# Patient Record
Sex: MALE | Race: WHITE | NOT HISPANIC OR LATINO | Employment: OTHER | URBAN - METROPOLITAN AREA
[De-identification: names, ages, dates, MRNs, and addresses within clinical notes are randomized per-mention and may not be internally consistent; named-entity substitution may affect disease eponyms.]

---

## 2020-12-18 ENCOUNTER — OFFICE VISIT (OUTPATIENT)
Dept: FAMILY MEDICINE CLINIC | Facility: CLINIC | Age: 30
End: 2020-12-18

## 2020-12-18 VITALS
HEIGHT: 72 IN | OXYGEN SATURATION: 100 % | HEART RATE: 78 BPM | SYSTOLIC BLOOD PRESSURE: 144 MMHG | RESPIRATION RATE: 16 BRPM | WEIGHT: 179.2 LBS | DIASTOLIC BLOOD PRESSURE: 84 MMHG | BODY MASS INDEX: 24.27 KG/M2 | TEMPERATURE: 99.1 F

## 2020-12-18 DIAGNOSIS — F41.9 ANXIETY AND DEPRESSION: ICD-10-CM

## 2020-12-18 DIAGNOSIS — F32.A ANXIETY AND DEPRESSION: ICD-10-CM

## 2020-12-18 DIAGNOSIS — K51.919 ULCERATIVE COLITIS WITH COMPLICATION, UNSPECIFIED LOCATION (HCC): ICD-10-CM

## 2020-12-18 DIAGNOSIS — B18.2 CHRONIC HEPATITIS C WITHOUT HEPATIC COMA (HCC): ICD-10-CM

## 2020-12-18 DIAGNOSIS — F19.90 SUBSTANCE USE DISORDER: Primary | ICD-10-CM

## 2020-12-18 DIAGNOSIS — F19.90 IV DRUG USER: ICD-10-CM

## 2020-12-18 PROCEDURE — 87522 HEPATITIS C REVRS TRNSCRPJ: CPT | Performed by: FAMILY MEDICINE

## 2020-12-18 PROCEDURE — 36415 COLL VENOUS BLD VENIPUNCTURE: CPT | Performed by: FAMILY MEDICINE

## 2020-12-18 PROCEDURE — 86803 HEPATITIS C AB TEST: CPT | Performed by: FAMILY MEDICINE

## 2020-12-18 PROCEDURE — 80053 COMPREHEN METABOLIC PANEL: CPT | Performed by: FAMILY MEDICINE

## 2020-12-18 PROCEDURE — 99204 OFFICE O/P NEW MOD 45 MIN: CPT | Performed by: FAMILY MEDICINE

## 2020-12-18 RX ORDER — CITALOPRAM 10 MG/1
10 TABLET ORAL DAILY
Qty: 15 TABLET | Refills: 0 | Status: SHIPPED | OUTPATIENT
Start: 2020-12-18 | End: 2021-01-05 | Stop reason: SDUPTHER

## 2020-12-18 NOTE — PROGRESS NOTES
New Patient Office Visit      Patient Name: Benitez Marrufo  : 1990   MRN: [id number]     Chief Complaint:    Chief Complaint   Patient presents with   • Establish Care   • Anxiety   • Depression       History of Present Illness: Benitez Marrufo is a 30 y.o. male who is here today to establish care.  Patient presents today with chronic substance use disorder.  Patient uses heroin and has used it 4 times in the last month.  Patient says he was using it over 10 times a month previously.  Patient has been to  and has been to the [facility].  Patient has tried Suboxone but did not respond well to that and says it precipitated his withdrawal.  Patient would like to get further help with his addiction.  Patient is okay with going to chemical dependency consult for further help.  Patient would like to quit and has a good support system.  Patient lives with his girlfriend, his son and her daughter.  Patient does report anhedonia.    Depression anxiety-patient has anxiety and depression has been worsening acutely.  Patient says after he stopped using heroin that he feels like there is a big blanket of depression on him.  Patient says he feels down he can feel very down at times.  Patient says other times he feels like he has appropriate energy levels.  Patient denies staying up for multiple nights in a row and denies excessive spending habits.  Patient does have risky gambling habits.  Patient filled out a PHQ-9 and the LYNN-7 and his PHQ-9 was 21 and his LYNN-7 was 18 today.  Patient is willing to start Celexa and he also like to go to a therapist.  Patient believes that stopping heroin has worsened his depression anxiety.  Patient does have concern for bipolar disorder and reports his sister has recently been diagnosed with it.  Patient denies a family history of bipolar disorder.  Patient does report having some mood swings.  Patient denies any suicidal thoughts or homicidal thoughts.  Patient is okay with starting  Celexa today.    Hepatitis C-patient reports being positive hepatitis C for the last few years and has never been treated.  Patient would like to go to hematologist to begin treatment.  Patient would like to quit using IV drugs and understands this may be a problem with chronic treatment.    Ulcerative colitis-patient has a history of ulcerative colitis.  Patient reports not been to a doctor for a very long time.  Patient reports that he does have some blood in his stool currently.  Patient says that doing heroin usually makes him constipated and he never notices ulcerative colitis but since has been off heroin that he has noticed more loose bowels and blood in his stool.  Patient denies any weight loss or stooling at night.    Subjective      Review of Systems:   Review of Systems   Constitutional: Positive for fatigue. Negative for fever and unexpected weight gain.   HENT: Negative for trouble swallowing.    Eyes: Negative for visual disturbance.   Respiratory: Negative for shortness of breath.    Cardiovascular: Negative for chest pain.   Gastrointestinal: Positive for blood in stool.   Endocrine: Negative for polyuria.   Genitourinary: Negative for difficulty urinating.   Musculoskeletal: Negative for back pain.   Skin: Negative for pallor.   Neurological: Negative for weakness.   Hematological: Does not bruise/bleed easily.   Psychiatric/Behavioral: Positive for dysphoric mood, sleep disturbance, depressed mood and stress. Negative for suicidal ideas. The patient is nervous/anxious.        Past Medical History: History reviewed. No pertinent past medical history.    Past Surgical History:   Past Surgical History:   Procedure Laterality Date   • ADENOIDECTOMY     • EAR TUBES     • HAND SURGERY     • TONSILLECTOMY         Family History:   Family History   Problem Relation Age of Onset   • No Known Problems Mother    • Hypertension Father    • No Known Problems Sister    • No Known Problems Son    • Lung cancer  "Maternal Grandmother    • Lung cancer Maternal Grandfather    • No Known Problems Paternal Grandmother    • No Known Problems Paternal Grandfather    • Depression Sister    • Bipolar disorder Sister        Social History:   Social History     Socioeconomic History   • Marital status: Single     Spouse name: Not on file   • Number of children: Not on file   • Years of education: Not on file   • Highest education level: Not on file   Tobacco Use   • Smoking status: Current Every Day Smoker     Packs/day: 0.50     Types: Cigarettes   • Smokeless tobacco: Never Used   Substance and Sexual Activity   • Alcohol use: Yes     Comment: social    • Drug use: Yes     Types: Heroin, Other     Comment: opiates       Medications:     Current Outpatient Medications:   •  citalopram (CeleXA) 10 MG tablet, Take 1 tablet by mouth Daily. Call for refill, Disp: 15 tablet, Rfl: 0    Allergies:   No Known Allergies    Objective     Physical Exam:  Vital Signs:   Vitals:    12/18/20 1059   BP: 144/84   BP Location: Right arm   Patient Position: Sitting   Cuff Size: Adult   Pulse: 78   Resp: 16   Temp: 99.1 °F (37.3 °C)   TempSrc: Temporal   SpO2: 100%   Weight: 81.3 kg (179 lb 3.2 oz)   Height: 182.9 cm (72\")   PainSc: 0-No pain     Body mass index is 24.3 kg/m².     Physical Exam  Vitals signs and nursing note reviewed.   Constitutional:       General: He is not in acute distress.     Appearance: He is well-developed.   HENT:      Head: Normocephalic and atraumatic.      Right Ear: External ear normal.      Left Ear: External ear normal.   Eyes:      General:         Right eye: No discharge.         Left eye: No discharge.      Conjunctiva/sclera: Conjunctivae normal.      Pupils: Pupils are equal, round, and reactive to light.   Neck:      Musculoskeletal: Normal range of motion and neck supple.   Cardiovascular:      Rate and Rhythm: Normal rate and regular rhythm.      Heart sounds: Normal heart sounds. No murmur.   Pulmonary:      " Effort: Pulmonary effort is normal. No respiratory distress.      Breath sounds: Normal breath sounds. No wheezing.   Abdominal:      General: Bowel sounds are normal. There is no distension.      Palpations: Abdomen is soft.   Musculoskeletal: Normal range of motion.         General: No deformity.   Skin:     General: Skin is warm and dry.   Neurological:      Mental Status: He is alert and oriented to person, place, and time.      Cranial Nerves: No cranial nerve deficit.   Psychiatric:         Behavior: Behavior normal.         Thought Content: Thought content normal.         Judgment: Judgment normal.         Assessment / Plan      Assessment/Plan:   Diagnoses and all orders for this visit:    1. Substance use disorder (Primary)  -     Ambulatory Referral to Chemical Dependency    2. Chronic hepatitis C without hepatic coma (CMS/HCC)  -     Ambulatory Referral to Gastroenterology  -     Hepatitis C Antibody  -     Cancel: Hepatitis C RNA, Quantitative, PCR (graph); Future  -     Comprehensive Metabolic Panel  -     Hepatitis C RNA, Quantitative, PCR (graph)    3. IV drug user  -     Ambulatory Referral to Chemical Dependency    4. Ulcerative colitis with complication, unspecified location (CMS/HCC)  -     Ambulatory Referral to Gastroenterology    5. Anxiety and depression  -     Ambulatory Referral to Behavioral Health  -     citalopram (CeleXA) 10 MG tablet; Take 1 tablet by mouth Daily. Call for refill  Dispense: 15 tablet; Refill: 0         1. I have referred patient to chemical dependency for further evaluation and management for substance use disorder.  I recommend trying methadone the patient has this may stabilize his use.  However patient is reluctant to start methadone and would not like to be on it long-term.  Patient would benefit from counseling and education regarding methadone and Suboxone and relapse of heroin addicts.  2. For patient's hepatitis C of started the work-up with a viral quant,  antibody test and a referral to GI.  I would like GI to also evaluate and manage his ulcerative colitis.  3. For patient's anxiety and depression I referred him to behavioral health for counseling.  Also started Celexa 10 mg in 2 weeks patient will call for refill at that time we will either change the medication, increase to 20 mg or keep it at 10 mg.  Hopefully patient has side effects he can push through the side effects and increase the dose as Celexa will likely help his mood.  If Celexa does not work then we will try Wellbutrin.  We may also add Wellbutrin to Celexa for its synergistic effects.  Patient will follow up in 6 weeks.  He may call back in between this time for any counseling recommendations on medications.  4. We will continue to keep track of patient's blood pressure as it is mildly elevated today.      Follow Up:   Return in about 6 weeks (around 1/29/2021) for anxiety/depression.    Latrell Montilla,   [facility] [place]       Please note that portions of this note may have been completed with a voice recognition program. Efforts were made to edit the dictations, but occasionally words are mistranscribed.

## 2020-12-19 LAB
ALBUMIN SERPL-MCNC: 4.9 G/DL (ref 3.5–5.2)
ALBUMIN/GLOB SERPL: 1.5 G/DL
ALP SERPL-CCNC: 100 U/L (ref 39–117)
ALT SERPL W P-5'-P-CCNC: 19 U/L (ref 1–41)
ANION GAP SERPL CALCULATED.3IONS-SCNC: 11.2 MMOL/L (ref 5–15)
AST SERPL-CCNC: 29 U/L (ref 1–40)
BILIRUB SERPL-MCNC: 0.3 MG/DL (ref 0–1.2)
BUN SERPL-MCNC: 12 MG/DL (ref 6–20)
BUN/CREAT SERPL: 14 (ref 7–25)
CALCIUM SPEC-SCNC: 10.5 MG/DL (ref 8.6–10.5)
CHLORIDE SERPL-SCNC: 104 MMOL/L (ref 98–107)
CO2 SERPL-SCNC: 24.8 MMOL/L (ref 22–29)
CREAT SERPL-MCNC: 0.86 MG/DL (ref 0.76–1.27)
GFR SERPL CREATININE-BSD FRML MDRD: 104 ML/MIN/1.73
GLOBULIN UR ELPH-MCNC: 3.2 GM/DL
GLUCOSE SERPL-MCNC: 83 MG/DL (ref 65–99)
HCV AB SER DONR QL: REACTIVE
POTASSIUM SERPL-SCNC: 5.2 MMOL/L (ref 3.5–5.2)
PROT SERPL-MCNC: 8.1 G/DL (ref 6–8.5)
SODIUM SERPL-SCNC: 140 MMOL/L (ref 136–145)

## 2020-12-21 LAB
HCV RNA SERPL NAA+PROBE-ACNC: 20 IU/ML
HCV RNA SERPL NAA+PROBE-LOG IU: 1.3 LOG10 IU/ML
TEST INFORMATION: NORMAL

## 2021-01-05 DIAGNOSIS — F32.A ANXIETY AND DEPRESSION: ICD-10-CM

## 2021-01-05 DIAGNOSIS — F41.9 ANXIETY AND DEPRESSION: ICD-10-CM

## 2021-01-05 RX ORDER — CITALOPRAM 10 MG/1
10 TABLET ORAL DAILY
Qty: 15 TABLET | Refills: 0 | Status: SHIPPED | OUTPATIENT
Start: 2021-01-05

## 2021-01-05 NOTE — TELEPHONE ENCOUNTER
Caller: Benitez Marrufo    Relationship: Self    Best call back number: [phone]    Medication needed: CITALOPRAM 10 MG      When do you need the refill by: ASAP    What details did the patient provide when requesting the medication: PATIENT WAS TOLD BY DR SAVAGE TO LET HIM KNOW HOW HE WAS DOING AND PATIENT FEELS LIKE IT COULD BE INCREASED AS HE STILL HAS HAD SOME EPISODES OF DEPRESSION      Does the patient have less than a 3 day supply:  [x] Yes  [] No    What is the patient's preferred pharmacy:      [facility]